# Patient Record
Sex: FEMALE | Race: WHITE | NOT HISPANIC OR LATINO | Employment: OTHER | URBAN - METROPOLITAN AREA
[De-identification: names, ages, dates, MRNs, and addresses within clinical notes are randomized per-mention and may not be internally consistent; named-entity substitution may affect disease eponyms.]

---

## 2023-12-29 ENCOUNTER — APPOINTMENT (OUTPATIENT)
Dept: GENERAL RADIOLOGY | Facility: CLINIC | Age: 78
End: 2023-12-29
Attending: FAMILY MEDICINE
Payer: COMMERCIAL

## 2023-12-29 ENCOUNTER — APPOINTMENT (OUTPATIENT)
Dept: CT IMAGING | Facility: CLINIC | Age: 78
End: 2023-12-29
Attending: EMERGENCY MEDICINE
Payer: COMMERCIAL

## 2023-12-29 ENCOUNTER — HOSPITAL ENCOUNTER (EMERGENCY)
Facility: CLINIC | Age: 78
Discharge: OTHER INSTITUTION | End: 2023-12-29
Attending: EMERGENCY MEDICINE | Admitting: EMERGENCY MEDICINE
Payer: COMMERCIAL

## 2023-12-29 VITALS
DIASTOLIC BLOOD PRESSURE: 76 MMHG | SYSTOLIC BLOOD PRESSURE: 138 MMHG | HEART RATE: 112 BPM | RESPIRATION RATE: 24 BRPM | TEMPERATURE: 99.3 F | OXYGEN SATURATION: 92 % | WEIGHT: 164.1 LBS

## 2023-12-29 DIAGNOSIS — R79.89 ELEVATED BRAIN NATRIURETIC PEPTIDE (BNP) LEVEL: ICD-10-CM

## 2023-12-29 DIAGNOSIS — J18.9 COMMUNITY ACQUIRED PNEUMONIA OF LEFT LOWER LOBE OF LUNG: ICD-10-CM

## 2023-12-29 DIAGNOSIS — R09.02 HYPOXEMIA: ICD-10-CM

## 2023-12-29 DIAGNOSIS — B33.8 RSV INFECTION: ICD-10-CM

## 2023-12-29 LAB
ALBUMIN SERPL BCG-MCNC: 3.5 G/DL (ref 3.5–5.2)
ALP SERPL-CCNC: 121 U/L (ref 40–150)
ALT SERPL W P-5'-P-CCNC: 15 U/L (ref 0–50)
ANION GAP SERPL CALCULATED.3IONS-SCNC: 17 MMOL/L (ref 7–15)
AST SERPL W P-5'-P-CCNC: 27 U/L (ref 0–45)
BASE EXCESS BLDV CALC-SCNC: -0.4 MMOL/L (ref -7.7–1.9)
BASOPHILS # BLD AUTO: 0 10E3/UL (ref 0–0.2)
BASOPHILS NFR BLD AUTO: 0 %
BILIRUB DIRECT SERPL-MCNC: <0.2 MG/DL (ref 0–0.3)
BILIRUB SERPL-MCNC: 0.5 MG/DL
BUN SERPL-MCNC: 10.9 MG/DL (ref 8–23)
CALCIUM SERPL-MCNC: 8.4 MG/DL (ref 8.8–10.2)
CHLORIDE SERPL-SCNC: 102 MMOL/L (ref 98–107)
CREAT SERPL-MCNC: 0.85 MG/DL (ref 0.51–0.95)
D DIMER PPP FEU-MCNC: 2.11 UG/ML FEU (ref 0–0.5)
DEPRECATED HCO3 PLAS-SCNC: 19 MMOL/L (ref 22–29)
EGFRCR SERPLBLD CKD-EPI 2021: 70 ML/MIN/1.73M2
EOSINOPHIL # BLD AUTO: 0.2 10E3/UL (ref 0–0.7)
EOSINOPHIL NFR BLD AUTO: 2 %
ERYTHROCYTE [DISTWIDTH] IN BLOOD BY AUTOMATED COUNT: 15.6 % (ref 10–15)
FLUAV RNA SPEC QL NAA+PROBE: NEGATIVE
FLUBV RNA RESP QL NAA+PROBE: NEGATIVE
GLUCOSE SERPL-MCNC: 119 MG/DL (ref 70–99)
HCO3 BLDV-SCNC: 25 MMOL/L (ref 21–28)
HCT VFR BLD AUTO: 40.8 % (ref 35–47)
HGB BLD-MCNC: 13.3 G/DL (ref 11.7–15.7)
HOLD SPECIMEN: NORMAL
IMM GRANULOCYTES # BLD: 0 10E3/UL
IMM GRANULOCYTES NFR BLD: 0 %
LACTATE SERPL-SCNC: 1.4 MMOL/L (ref 0.7–2)
LYMPHOCYTES # BLD AUTO: 1.1 10E3/UL (ref 0.8–5.3)
LYMPHOCYTES NFR BLD AUTO: 13 %
MCH RBC QN AUTO: 28.5 PG (ref 26.5–33)
MCHC RBC AUTO-ENTMCNC: 32.6 G/DL (ref 31.5–36.5)
MCV RBC AUTO: 87 FL (ref 78–100)
MONOCYTES # BLD AUTO: 0.5 10E3/UL (ref 0–1.3)
MONOCYTES NFR BLD AUTO: 6 %
NEUTROPHILS # BLD AUTO: 6.7 10E3/UL (ref 1.6–8.3)
NEUTROPHILS NFR BLD AUTO: 79 %
NRBC # BLD AUTO: 0 10E3/UL
NRBC BLD AUTO-RTO: 0 /100
NT-PROBNP SERPL-MCNC: 3645 PG/ML (ref 0–1800)
O2/TOTAL GAS SETTING VFR VENT: 36 %
PCO2 BLDV: 43 MM HG (ref 40–50)
PH BLDV: 7.37 [PH] (ref 7.32–7.43)
PLATELET # BLD AUTO: 198 10E3/UL (ref 150–450)
PO2 BLDV: 25 MM HG (ref 25–47)
POTASSIUM SERPL-SCNC: 3.4 MMOL/L (ref 3.4–5.3)
PROCALCITONIN SERPL IA-MCNC: 0.3 NG/ML
PROT SERPL-MCNC: 6.5 G/DL (ref 6.4–8.3)
RBC # BLD AUTO: 4.67 10E6/UL (ref 3.8–5.2)
RSV RNA SPEC NAA+PROBE: POSITIVE
SARS-COV-2 RNA RESP QL NAA+PROBE: NEGATIVE
SODIUM SERPL-SCNC: 138 MMOL/L (ref 135–145)
TROPONIN T SERPL HS-MCNC: 34 NG/L
WBC # BLD AUTO: 8.7 10E3/UL (ref 4–11)

## 2023-12-29 PROCEDURE — 87637 SARSCOV2&INF A&B&RSV AMP PRB: CPT | Performed by: EMERGENCY MEDICINE

## 2023-12-29 PROCEDURE — 258N000003 HC RX IP 258 OP 636: Performed by: EMERGENCY MEDICINE

## 2023-12-29 PROCEDURE — 93005 ELECTROCARDIOGRAM TRACING: CPT | Performed by: EMERGENCY MEDICINE

## 2023-12-29 PROCEDURE — 72192 CT PELVIS W/O DYE: CPT

## 2023-12-29 PROCEDURE — 83605 ASSAY OF LACTIC ACID: CPT | Performed by: EMERGENCY MEDICINE

## 2023-12-29 PROCEDURE — 84145 PROCALCITONIN (PCT): CPT | Performed by: EMERGENCY MEDICINE

## 2023-12-29 PROCEDURE — 250N000011 HC RX IP 250 OP 636: Performed by: EMERGENCY MEDICINE

## 2023-12-29 PROCEDURE — 85379 FIBRIN DEGRADATION QUANT: CPT | Performed by: EMERGENCY MEDICINE

## 2023-12-29 PROCEDURE — 71045 X-RAY EXAM CHEST 1 VIEW: CPT

## 2023-12-29 PROCEDURE — 84484 ASSAY OF TROPONIN QUANT: CPT | Performed by: FAMILY MEDICINE

## 2023-12-29 PROCEDURE — 82803 BLOOD GASES ANY COMBINATION: CPT | Performed by: EMERGENCY MEDICINE

## 2023-12-29 PROCEDURE — 71275 CT ANGIOGRAPHY CHEST: CPT

## 2023-12-29 PROCEDURE — 82040 ASSAY OF SERUM ALBUMIN: CPT | Performed by: EMERGENCY MEDICINE

## 2023-12-29 PROCEDURE — 96365 THER/PROPH/DIAG IV INF INIT: CPT | Mod: 59 | Performed by: EMERGENCY MEDICINE

## 2023-12-29 PROCEDURE — 83880 ASSAY OF NATRIURETIC PEPTIDE: CPT | Performed by: FAMILY MEDICINE

## 2023-12-29 PROCEDURE — 85041 AUTOMATED RBC COUNT: CPT | Performed by: FAMILY MEDICINE

## 2023-12-29 PROCEDURE — 80048 BASIC METABOLIC PNL TOTAL CA: CPT | Performed by: EMERGENCY MEDICINE

## 2023-12-29 PROCEDURE — 36415 COLL VENOUS BLD VENIPUNCTURE: CPT | Performed by: EMERGENCY MEDICINE

## 2023-12-29 PROCEDURE — 99285 EMERGENCY DEPT VISIT HI MDM: CPT | Mod: 25 | Performed by: EMERGENCY MEDICINE

## 2023-12-29 PROCEDURE — 250N000009 HC RX 250: Performed by: EMERGENCY MEDICINE

## 2023-12-29 PROCEDURE — 93010 ELECTROCARDIOGRAM REPORT: CPT | Mod: 59 | Performed by: EMERGENCY MEDICINE

## 2023-12-29 RX ORDER — ASPIRIN 81 MG/1
81 TABLET ORAL DAILY
COMMUNITY
Start: 2023-12-28

## 2023-12-29 RX ORDER — FLUOCINONIDE TOPICAL SOLUTION USP, 0.05% 0.5 MG/ML
SOLUTION TOPICAL 2 TIMES DAILY PRN
COMMUNITY

## 2023-12-29 RX ORDER — VITAMIN B COMPLEX
25 TABLET ORAL DAILY
COMMUNITY
Start: 2023-12-28

## 2023-12-29 RX ORDER — ALBUTEROL SULFATE 90 UG/1
1 AEROSOL, METERED RESPIRATORY (INHALATION) EVERY 4 HOURS PRN
COMMUNITY
Start: 2023-12-28

## 2023-12-29 RX ORDER — IPRATROPIUM BROMIDE AND ALBUTEROL SULFATE 2.5; .5 MG/3ML; MG/3ML
3 SOLUTION RESPIRATORY (INHALATION) ONCE
Status: COMPLETED | OUTPATIENT
Start: 2023-12-29 | End: 2023-12-29

## 2023-12-29 RX ORDER — AZITHROMYCIN 500 MG/1
500 INJECTION, POWDER, LYOPHILIZED, FOR SOLUTION INTRAVENOUS EVERY 24 HOURS
Status: DISCONTINUED | OUTPATIENT
Start: 2023-12-29 | End: 2023-12-29 | Stop reason: HOSPADM

## 2023-12-29 RX ORDER — ACETAMINOPHEN 325 MG/1
650 TABLET ORAL EVERY 6 HOURS PRN
COMMUNITY
Start: 2023-12-28

## 2023-12-29 RX ORDER — DULOXETIN HYDROCHLORIDE 20 MG/1
20 CAPSULE, DELAYED RELEASE ORAL DAILY
COMMUNITY
Start: 2023-12-21

## 2023-12-29 RX ORDER — IOPAMIDOL 755 MG/ML
500 INJECTION, SOLUTION INTRAVASCULAR ONCE
Status: COMPLETED | OUTPATIENT
Start: 2023-12-29 | End: 2023-12-29

## 2023-12-29 RX ORDER — KETOCONAZOLE 20 MG/ML
SHAMPOO TOPICAL
COMMUNITY
Start: 2022-04-25

## 2023-12-29 RX ORDER — CEFTRIAXONE 2 G/1
2 INJECTION, POWDER, FOR SOLUTION INTRAMUSCULAR; INTRAVENOUS ONCE
Status: COMPLETED | OUTPATIENT
Start: 2023-12-29 | End: 2023-12-29

## 2023-12-29 RX ORDER — ROSUVASTATIN CALCIUM 20 MG/1
1 TABLET, COATED ORAL AT BEDTIME
COMMUNITY
Start: 2023-08-24

## 2023-12-29 RX ORDER — VIT A/VIT C/VIT E/ZINC/COPPER 4296-226
1 CAPSULE ORAL 2 TIMES DAILY
COMMUNITY

## 2023-12-29 RX ORDER — SODIUM CHLORIDE 9 MG/ML
1000 INJECTION, SOLUTION INTRAVENOUS CONTINUOUS
Status: DISCONTINUED | OUTPATIENT
Start: 2023-12-29 | End: 2023-12-29 | Stop reason: HOSPADM

## 2023-12-29 RX ORDER — CETIRIZINE HYDROCHLORIDE 10 MG/1
1 TABLET ORAL DAILY
COMMUNITY
Start: 2022-04-26

## 2023-12-29 RX ORDER — IBUPROFEN 200 MG
600 TABLET ORAL EVERY 6 HOURS PRN
COMMUNITY
Start: 2023-12-28

## 2023-12-29 RX ORDER — TRIAMCINOLONE ACETONIDE 1 MG/G
CREAM TOPICAL 2 TIMES DAILY PRN
COMMUNITY
Start: 2022-03-16

## 2023-12-29 RX ORDER — BACLOFEN 10 MG/1
10 TABLET ORAL 3 TIMES DAILY PRN
COMMUNITY
Start: 2023-12-28

## 2023-12-29 RX ADMIN — SODIUM CHLORIDE 70 ML: 9 INJECTION, SOLUTION INTRAVENOUS at 07:37

## 2023-12-29 RX ADMIN — IOPAMIDOL 70 ML: 755 INJECTION, SOLUTION INTRAVENOUS at 07:38

## 2023-12-29 RX ADMIN — IPRATROPIUM BROMIDE AND ALBUTEROL SULFATE 3 ML: .5; 3 SOLUTION RESPIRATORY (INHALATION) at 07:39

## 2023-12-29 RX ADMIN — SODIUM CHLORIDE 250 ML: 9 INJECTION, SOLUTION INTRAVENOUS at 07:43

## 2023-12-29 RX ADMIN — CEFTRIAXONE SODIUM 2 G: 2 INJECTION, POWDER, FOR SOLUTION INTRAMUSCULAR; INTRAVENOUS at 07:43

## 2023-12-29 RX ADMIN — AZITHROMYCIN MONOHYDRATE 500 MG: 500 INJECTION, POWDER, LYOPHILIZED, FOR SOLUTION INTRAVENOUS at 09:14

## 2023-12-29 RX ADMIN — SODIUM CHLORIDE 1000 ML: 9 INJECTION, SOLUTION INTRAVENOUS at 09:49

## 2023-12-29 ASSESSMENT — ACTIVITIES OF DAILY LIVING (ADL)
ADLS_ACUITY_SCORE: 41
ADLS_ACUITY_SCORE: 35

## 2023-12-29 NOTE — ED TRIAGE NOTES
Patient brought in from [facility] for shortness of breath and hypoxia.     Triage Assessment (Adult)       Row Name 12/29/23 0644          Triage Assessment    Airway WDL WDL        Respiratory WDL    Respiratory WDL X;rhythm/pattern;cough     Rhythm/Pattern, Respiratory shortness of breath     Cough Frequency frequent     Cough Type loose;productive        Skin Circulation/Temperature WDL    Skin Circulation/Temperature WDL WDL        Cardiac WDL    Cardiac WDL X     Cardiac Rhythm ST        Peripheral/Neurovascular WDL    Peripheral Neurovascular WDL WDL        Cognitive/Neuro/Behavioral WDL    Cognitive/Neuro/Behavioral WDL WDL

## 2023-12-29 NOTE — PROGRESS NOTES
Patient gave permission to discuss care with son, Eddie, whom lives approx 2.5 hours away. He is on his way now. He can be reached at [phone]

## 2023-12-29 NOTE — MEDICATION SCRIBE - ADMISSION MEDICATION HISTORY
Medication Scribe Admission Medication History    Admission medication history is complete. The information provided in this note is only as accurate as the sources available at the time of the update.    Information Source(s): Facility ([facility]/NH/) medication list/MAR via N/A    Pertinent Information: Patient admitted to [facility] from [facility] 11/28/23 approximately 1415 (per notes from [place]). All medications on PTA list confirmed as current/active. [place] [facility] has not administered any medications to patient with exceptions of rosuvastatin and albuterol inhaler (last doses documented). Documentation from discharge from [facility] does not specify last doses they provided to patient before their discharge.     Changes made to PTA medication list:  Added: Baclofen 10 mg   Cholecalciferol 25 mcg   Fluocinonide Ointment   Added per [facility] MAR   Deleted: None  Changed: None    Medication Affordability:  Not including over the counter (OTC) medications, was there a time in the past 3 months when you did not take your medications as prescribed because of cost?: Unable to Assess    Allergies reviewed with patient and updates made in EHR: no    Medication History Completed By: ASPEN GONZALES 12/29/2023 12:26 PM    PTA Med List   Medication Sig Last Dose    acetaminophen (TYLENOL) 325 MG tablet Take 650 mg by mouth every 6 hours as needed Unknown at unknown    albuterol (PROAIR HFA/PROVENTIL HFA/VENTOLIN HFA) 108 (90 Base) MCG/ACT inhaler Inhale 1 puff into the lungs every 4 hours as needed for shortness of breath, wheezing or cough 12/29/2023 at 0347    aspirin 81 MG EC tablet Take 81 mg by mouth daily Unknown at Unknown    baclofen (LIORESAL) 10 MG tablet Take 10 mg by mouth 3 times daily as needed for muscle spasms Unknown at Unknown    cetirizine (ZYRTEC) 10 MG tablet Take 1 tablet by mouth daily Unknown at Unknown    DULoxetine (CYMBALTA) 20 MG capsule Take 20 mg by mouth daily Unknown at Unknown    fluocinonide (LIDEX)  0.05 % external solution Apply topically 2 times daily as needed (dermatitis) Unknown at v    ketoconazole (NIZORAL) 2 % external shampoo Apply topically every 7 days Unknown at Unknown    Multiple Vitamins-Minerals (PRESERVISION AREDS) CAPS Take 1 capsule by mouth 2 times daily Unknown at Unknown    rosuvastatin (CRESTOR) 20 MG tablet Take 1 tablet by mouth at bedtime 12/28/2023 at 2314    triamcinolone (KENALOG) 0.1 % external cream Apply topically 2 times daily as needed for irritation Unknown at Unknown    umeclidinium-vilanterol (ANORO ELLIPTA) 62.5-25 MCG/ACT oral inhaler Inhale 1 puff into the lungs daily Unknown at Unknown    vitamin B-12 (CYANOCOBALAMIN) 500 MCG tablet Take 500 mcg by mouth daily Unknown at Unknown    Vitamin D3 (CHOLECALCIFEROL) 25 mcg (1000 units) tablet Take 25 mcg by mouth daily Unknown at Unknown

## 2023-12-29 NOTE — ED PROVIDER NOTES
"  History     Chief Complaint   Patient presents with    Shortness of Breath     HPI  History per patient, medical records    This is a 78-year-old female, history of COPD, presenting with shortness of breath.  Patient was sent from a transitional care unit with increasing weakness, cough, shortness of breath.  Patient recently had a short hospitalization 12/27-28 after 2 separate falls in her home.  Per outside records they may have been syncopal falls.  At the time she was noted to have mild hyponatremia with sodium 131.  X-rays of the pelvis negative.  She was evaluated by physical therapy and deemed appropriate for a transitional care unit.  At the TCU, patient noted to have mild hypoxia, increasing shortness of breath and cough.  Patient denies fever.  She notes her cough sounds productive but she is unable to cough anything up.  She notes she has had \"shortness of breath for a long time\".  She does use an albuterol inhaler.  She is not usually on oxygen.  She was brought in by EMS on 4 L of O2.  No nausea, vomiting.  No diarrhea.  She denies chest pain or abdominal pain.  She notes decreased urine output.  Patient notes she currently has pain \"in her butt\" secondary to the fall.  This pain is across her low sacral/coccyx area into the buttocks bilaterally.  Patient has history of lung cancer status post left lower lobectomy.  She also has history of coronary artery disease status post stenting x 2.    Allergies:  Allergies   Allergen Reactions    Mold      Other Reaction(s): Runny Nose    Gabapentin Other (See Comments)     Imbalance, falls - occurred years ago, outside [facility].    Morphine Itching    Penicillins Itching    Shellfish-Derived Products GI Disturbance       Problem List:    There are no problems to display for this patient.       Past Medical History:    No past medical history on file.    Past Surgical History:    Past Surgical History:   Procedure Laterality Date    IR ILIAC ARTERY ANGIOPLASTY  " 6/2/2016    IR ILIAC ARTERY ANGIOPLASTY  6/2/2016       Family History:    No family history on file.    Social History:  Marital Status:          Medications:    acetaminophen (TYLENOL) 325 MG tablet  albuterol (PROAIR HFA/PROVENTIL HFA/VENTOLIN HFA) 108 (90 Base) MCG/ACT inhaler  aspirin 81 MG EC tablet  baclofen (LIORESAL) 10 MG tablet  cetirizine (ZYRTEC) 10 MG tablet  DULoxetine (CYMBALTA) 20 MG capsule  fluocinonide (LIDEX) 0.05 % external solution  ketoconazole (NIZORAL) 2 % external shampoo  Multiple Vitamins-Minerals (PRESERVISION AREDS) CAPS  rosuvastatin (CRESTOR) 20 MG tablet  triamcinolone (KENALOG) 0.1 % external cream  umeclidinium-vilanterol (ANORO ELLIPTA) 62.5-25 MCG/ACT oral inhaler  vitamin B-12 (CYANOCOBALAMIN) 500 MCG tablet  Vitamin D3 (CHOLECALCIFEROL) 25 mcg (1000 units) tablet  ibuprofen (ADVIL/MOTRIN) 200 MG tablet          Review of Systems  All other ROS reviewed and are negative or non-contributory except as stated in HPI.     Physical Exam   BP: (!) 190/104  Pulse: 111  Temp: 99.3  F (37.4  C)  Resp: 24  Weight: 74.4 kg (164 lb 1.6 oz)  SpO2: 96 %      Physical Exam  Vitals and nursing note reviewed.   Constitutional:       Appearance: She is well-developed.      Comments: Older female sitting upright in the bed, nasal cannula in place.  Able to speak in full sentences.  Intermittent wet sounding cough.   HENT:      Head: Normocephalic.      Nose: Nose normal.      Mouth/Throat:      Mouth: Mucous membranes are moist.      Pharynx: Oropharynx is clear.   Eyes:      Extraocular Movements: Extraocular movements intact.      Conjunctiva/sclera: Conjunctivae normal.   Cardiovascular:      Rate and Rhythm: Regular rhythm. Tachycardia present.      Pulses: Normal pulses.      Heart sounds: Normal heart sounds.   Pulmonary:      Comments: Rhonchi heard especially on the left.  Mild tachypnea.  Nasal cannula with O2 at 4 L, O2 sats 98%.  This was decreased slightly to 3 L with O2 sats at  "96%.  Musculoskeletal:         General: Normal range of motion.      Cervical back: Normal range of motion and neck supple.      Right lower leg: No edema.      Left lower leg: No edema.      Comments: Tenderness about upper buttock across buttocks.  No external abnormalities.   Skin:     General: Skin is warm and dry.      Coloration: Skin is not pale.      Findings: No rash.   Neurological:      General: No focal deficit present.      Mental Status: She is alert. Mental status is at baseline.   Psychiatric:         Mood and Affect: Mood normal.         Behavior: Behavior normal.         ED Course (with Medical Decision Making)    Pt seen and examined by me.  RN and EPIC notes reviewed.      Older female patient, history of COPD, presenting with shortness of breath.  On exam, patient noted to have a wet productive sounding cough.  She is on oxygen per nasal cannula at 4 L with O2 sats at 96 to 98%.  This was decreased to 3 L and she continued to have O2 sats 96%.  Concern for underlying infection including pneumonia, viral infection, COPD exacerbation, other cause.  It is possible she has a PE.    Plan EKG, labs, check viral swab, chest x-ray.  I am going to give her a DuoNeb.    EKG shows sinus tachycardia with a rate of 122.  She has diffuse T wave flattening.  Machine reads old anteroseptal infarct.  She has low voltage with \"rightward P axis and rotation\" concerning for primary pulmonary disease.  This was read by myself at 7:30 AM.    Labs drawn before my arrival in the ED.  Patient noted to have CMP with slightly low bicarb at 19, anion gap 17.  Otherwise normal electrolytes, glucose 119.  VBG normal.  Lactic acid within normal limits.  Normal white blood cell count.  Patient's troponin was very slightly elevated at 34.  BNP also mildly elevated at 3645.  D-dimer high at 2.11.  Procalcitonin within normal limits.  LFTs normal.    Patient is RSV positive.    Chest x-ray showed mild linear atelectasis or L " infiltrate in the left lower lobe with increased and coarsened interstitial lung markings.    With the high D-dimer, a CT scan of the chest was done.  Chest CT was negative for PE.  She has mild bilateral airspace opacities consistent with pneumonia.  Moderate emphysema.  Left lower lobectomy.  Indeterminate right upper lobe nodule.  Small partially loculated left pleural effusion.    IV antibiotic started for pneumonia, Zithromax and ceftriaxone.    I also did do a CT scan of the pelvis because of her fall and pain.  No obvious fracture noted although MRI might be prudent if she continues to have significant pain.    I think with her significant hypoxia, pneumonia, RSV it would be best for patient to be admitted for further management and care.  Unfortunately, she has [employer] insurance and cannot stay in a therapy facility.  We were able to find a bed within the [facility] [place], and the hospitalist graciously accepted the patient for transfer.  There was a prolonged wait in the ED for EMS transfer.  Patient remained stable throughout.  We did try to wean her oxygen but O2 sats noted to be 88% and this was increased back up.  Patient's son here and results and plans discussed with both him and patient.      Procedures    Results for orders placed or performed during the hospital encounter of 12/29/23 (from the past 24 hour(s))   CBC with platelets differential    Narrative    The following orders were created for panel order CBC with platelets differential.  Procedure                               Abnormality         Status                     ---------                               -----------         ------                     CBC with platelets and d...[[id number]]  Abnormal            Final result                 Please view results for these tests on the individual orders.   Troponin T, High Sensitivity   Result Value Ref Range    Troponin T, High Sensitivity 34 (H) <=14 ng/L   Nt probnp inpatient (BNP)    Result Value Ref Range    N terminal Pro BNP Inpatient 3,645 (H) 0 - 1,800 pg/mL   Basic metabolic panel   Result Value Ref Range    Sodium 138 135 - 145 mmol/L    Potassium 3.4 3.4 - 5.3 mmol/L    Chloride 102 98 - 107 mmol/L    Carbon Dioxide (CO2) 19 (L) 22 - 29 mmol/L    Anion Gap 17 (H) 7 - 15 mmol/L    Urea Nitrogen 10.9 8.0 - 23.0 mg/dL    Creatinine 0.85 0.51 - 0.95 mg/dL    GFR Estimate 70 >60 mL/min/1.73m2    Calcium 8.4 (L) 8.8 - 10.2 mg/dL    Glucose 119 (H) 70 - 99 mg/dL   Blood gas venous   Result Value Ref Range    pH Venous 7.37 7.32 - 7.43    pCO2 Venous 43 40 - 50 mm Hg    pO2 Venous 25 25 - 47 mm Hg    Bicarbonate Venous 25 21 - 28 mmol/L    Base Excess/Deficit -0.4 -7.7 - 1.9 mmol/L    FIO2 36    Lactic acid whole blood   Result Value Ref Range    Lactic Acid 1.4 0.7 - 2.0 mmol/L   CBC with platelets and differential   Result Value Ref Range    WBC Count 8.7 4.0 - 11.0 10e3/uL    RBC Count 4.67 3.80 - 5.20 10e6/uL    Hemoglobin 13.3 11.7 - 15.7 g/dL    Hematocrit 40.8 35.0 - 47.0 %    MCV 87 78 - 100 fL    MCH 28.5 26.5 - 33.0 pg    MCHC 32.6 31.5 - 36.5 g/dL    RDW 15.6 (H) 10.0 - 15.0 %    Platelet Count 198 150 - 450 10e3/uL    % Neutrophils 79 %    % Lymphocytes 13 %    % Monocytes 6 %    % Eosinophils 2 %    % Basophils 0 %    % Immature Granulocytes 0 %    NRBCs per 100 WBC 0 <1 /100    Absolute Neutrophils 6.7 1.6 - 8.3 10e3/uL    Absolute Lymphocytes 1.1 0.8 - 5.3 10e3/uL    Absolute Monocytes 0.5 0.0 - 1.3 10e3/uL    Absolute Eosinophils 0.2 0.0 - 0.7 10e3/uL    Absolute Basophils 0.0 0.0 - 0.2 10e3/uL    Absolute Immature Granulocytes 0.0 <=0.4 10e3/uL    Absolute NRBCs 0.0 10e3/uL   Extra Tube    Narrative    The following orders were created for panel order Extra Tube.  Procedure                               Abnormality         Status                     ---------                               -----------         ------                     Extra Blue Top Tube[[id number]]                               Final result                 Please view results for these tests on the individual orders.   Extra Blue Top Tube   Result Value Ref Range    Hold Specimen JIC    D dimer quantitative   Result Value Ref Range    D-Dimer Quantitative 2.11 (H) 0.00 - 0.50 ug/mL FEU    Narrative    This D-dimer assay is intended for use in conjunction with a clinical pretest probability assessment model to exclude pulmonary embolism (PE) and deep venous thrombosis (DVT) in outpatients suspected of PE or DVT. The cut-off value is 0.50 ug/mL FEU.    For patients 50 years of age or older, the application of age-adjusted cut-off values for D-Dimer may increase the specificity without significant effect on sensitivity. The literature suggested calculation age adjusted cut-off in ug/L = age in years x 10 ug/L. The results in this laboratory are reported as ug/mL rather than ug/L. The calculation for age adjusted cut off in ug/mL= age in years x 0.01 ug/mL. For example, the cut off for a 76 year old male is 77 x 0.01 ug/mL = 0.76 ug/mL (760 ug/L).    M Paulina et al. Age adjusted D-dimer cut-off levels to rule out pulmonary embolism: The ADJUST-PE Study. VICTORIA 2014;311:4872-1664.; HJ Constantine et al. Diagnostic accuracy of conventional or age adjusted D-dimer cutoff values in older patients with suspected venous thromboembolism. Systemic review and meta-analysis. BMJ 2013:346:f2492.   Hepatic panel   Result Value Ref Range    Protein Total 6.5 6.4 - 8.3 g/dL    Albumin 3.5 3.5 - 5.2 g/dL    Bilirubin Total 0.5 <=1.2 mg/dL    Alkaline Phosphatase 121 40 - 150 U/L    AST 27 0 - 45 U/L    ALT 15 0 - 50 U/L    Bilirubin Direct <0.20 0.00 - 0.30 mg/dL   Procalcitonin   Result Value Ref Range    Procalcitonin 0.30 <0.50 ng/mL   Symptomatic Influenza A/B, RSV, & SARS-CoV2 PCR (COVID-19) Nose    Specimen: Nose; Swab   Result Value Ref Range    Influenza A PCR Negative Negative    Influenza B PCR Negative Negative    RSV PCR Positive (A)  Negative    SARS CoV2 PCR Negative Negative    Narrative    Testing was performed using the Xpert Xpress CoV2/Flu/RSV Assay on the [employer] GeneXpert Instrument. This test should be ordered for the detection of SARS-CoV-2, influenza, and RSV viruses in individuals who meet clinical and/or epidemiological criteria. Test performance is unknown in asymptomatic patients. This test is for in vitro diagnostic use under the FDA EUA for laboratories certified under CLIA to perform high or moderate complexity testing. This test has not been FDA cleared or approved. A negative result does not rule out the presence of PCR inhibitors in the specimen or target RNA in concentration below the limit of detection for the assay. If only one viral target is positive but coinfection with multiple targets is suspected, the sample should be re-tested with another FDA cleared, approved, or authorized test, if coinfection would change clinical management. This test was validated by the [facility]. These laboratories are certified under the Clinical Laboratory Improvement Amendments of 1988 (CLIA-88) as qualified to perform high complexity laboratory testing.   XR Chest Port 1 View    Narrative    XR CHEST PORT 1 VIEW 12/29/2023 7:14 AM    HISTORY: SOA, hypoxemia    COMPARISON: None.    FINDINGS: Mild linear atelectasis or infiltrate in the left lower  lobe. There are increased, coarsened interstitial lung markings. No  large effusions or pneumothorax. Normal cardiac size. Aortic  atherosclerosis. Diffuse demineralization in the bones.    GINA OCHOA MD         SYSTEM ID:  [id number]   CT Chest Pulmonary Embolism w Contrast    Narrative    CT CHEST PULMONARY EMBOLISM W CONTRAST 12/29/2023 8:37 AM    CLINICAL HISTORY: SOA; h/o left lower lobectomy; elevated d dimer;  emphysema, SOA; h/o left lower lobectomy; elevated d dimer; emphysema  TECHNIQUE: CT angiogram chest during arterial phase injection IV  contrast. 2D  and 3D MIP reconstructions were performed by the CT  technologist. Dose reduction techniques were used.     CONTRAST: 70mL, Isovue 370    COMPARISON: Chest radiograph 12/29/2023.    FINDINGS:  ANGIOGRAM CHEST: Pulmonary arteries are normal caliber and negative  for pulmonary emboli. Thoracic aorta is not well opacified and is   indeterminate for dissection. No CT evidence of right heart strain.    LUNGS AND PLEURA: Left lower lobectomy. Moderate emphysema. Moderate  biapical scarring. Mild patchy infiltrates left upper lobe posteriorly  and right lower lobe suspicious for pneumonia. Indeterminate 5 mm  nodule in the right upper lobe on series 7 image 142). The large  airways are patent. Small partially loculated left pleural effusion.    MEDIASTINUM/AXILLAE: No lymphadenopathy. Normal caliber aorta. Small  hiatal hernia.    CORONARY ARTERY CALCIFICATION: Mild.    UPPER ABDOMEN: Tiny left hepatic lobe requires follow-up.    MUSCULOSKELETAL: Normal.      Impression    IMPRESSION:  1.  Negative for pulmonary embolism.  2.  Mild bilateral airspace opacities consistent with pneumonia.  3.  Left lower lobectomy and moderate emphysema.  4.  Indeterminate 5 mm right upper lobe pulmonary nodule.  5.  Small partially loculated left pleural effusion.    REFERENCE:  Guidelines for Management of Incidental Pulmonary Nodules Detected on  CT Images: From the Fleischner Society 2017.   Guidelines apply to incidental nodules in patients who are 35 years or  older.  Guidelines do not apply to lung cancer screening, patients with  immunosuppression, or patients with known primary cancer.    SINGLE NODULE  Nodule size <6 mm  Low-risk patients: No follow-up needed.  High-risk patients: Optional follow-up at 12 months.    JUJU RODRÍGUEZ MD         SYSTEM ID:  [id number]   CT Pelvis Bone wo Contrast    Narrative    CT PELVIS BONE WITHOUT CONTRAST 12/29/2023 8:38 AM    INDICATION: Fall; buttock pain - negative x-ray outside  facility.  COMPARISON: None.    TECHNIQUE: Noncontrast. Axial, sagittal and coronal thin-section  reconstruction. Dose reduction techniques were used.   CONTRAST: None.    FINDINGS: No acute fracture or malalignment. There is diffuse osseous  demineralization, which can limit CT sensitivity for nondisplaced  fractures.    There is mild right and mild to moderate left hip degenerative  arthrosis. There are also degenerative changes in the lower lumbar  spine and at the pubic symphysis.    Vascular atherosclerotic calcifications are noted. No pelvic free  fluid.      Impression    IMPRESSION:  1.  No fracture or malalignment is identified. Given the degree of  osseous demineralization, MRI would be more sensitive for nondisplaced  fractures and could be performed if there is persistent clinical  concern.  2.  Additional chronic appearing findings, as described.     MG HAN MD         SYSTEM ID:  [id number]       Medications   ipratropium - albuterol 0.5 mg/2.5 mg/3 mL (DUONEB) neb solution 3 mL (3 mLs Nebulization $Given 12/29/23 0739)   sodium chloride 0.9% BOLUS 250 mL (0 mLs Intravenous Stopped 12/29/23 0949)   iopamidol (ISOVUE-370) solution 500 mL (70 mLs Intravenous $Given 12/29/23 0738)   sodium chloride 0.9 % bag 100mL for CT scan flush use (70 mLs Intravenous $Given 12/29/23 0737)   cefTRIAXone (ROCEPHIN) 2 g vial to attach to  ml bag for ADULTS or NS 50 ml bag for PEDS (0 g Intravenous Stopped 12/29/23 0900)       Assessments & Plan     I have reviewed the findings, diagnosis, plan with the patient.  Discharge Medication List as of 12/29/2023  6:58 PM          Final diagnoses:   RSV infection   Community acquired pneumonia of left lower lobe of lung   Hypoxemia   Elevated brain natriuretic peptide (BNP) level     Disposition: Patient transferred to [facility] in stable condition.    Note: Chart documentation done in part with Dragon Voice Recognition software. Although reviewed after  completion, some word and grammatical errors may remain.     12/29/2023   [facility] EMERGENCY DEPT       Mini Madera MD  12/29/23 8594

## 2024-01-04 ENCOUNTER — DOCUMENTATION ONLY (OUTPATIENT)
Dept: OTHER | Facility: CLINIC | Age: 79
End: 2024-01-04
Payer: COMMERCIAL

## 2024-01-04 ENCOUNTER — LAB REQUISITION (OUTPATIENT)
Dept: LAB | Facility: CLINIC | Age: 79
End: 2024-01-04

## 2024-01-05 PROCEDURE — P9604 ONE-WAY ALLOW PRORATED TRIP: HCPCS | Performed by: FAMILY MEDICINE

## 2024-01-05 PROCEDURE — 86481 TB AG RESPONSE T-CELL SUSP: CPT | Performed by: FAMILY MEDICINE

## 2024-01-05 PROCEDURE — 36415 COLL VENOUS BLD VENIPUNCTURE: CPT | Performed by: FAMILY MEDICINE

## 2024-01-08 LAB
GAMMA INTERFERON BACKGROUND BLD IA-ACNC: 0.05 IU/ML
M TB IFN-G BLD-IMP: NEGATIVE
M TB IFN-G CD4+ BCKGRND COR BLD-ACNC: 0.95 IU/ML
MITOGEN IGNF BCKGRD COR BLD-ACNC: 0.05 IU/ML
MITOGEN IGNF BCKGRD COR BLD-ACNC: 0.12 IU/ML
QUANTIFERON MITOGEN: 1 IU/ML
QUANTIFERON NIL TUBE: 0.05 IU/ML
QUANTIFERON TB1 TUBE: 0.1 IU/ML
QUANTIFERON TB2 TUBE: 0.17